# Patient Record
Sex: MALE | Race: WHITE
[De-identification: names, ages, dates, MRNs, and addresses within clinical notes are randomized per-mention and may not be internally consistent; named-entity substitution may affect disease eponyms.]

---

## 2021-07-03 ENCOUNTER — HOSPITAL ENCOUNTER (EMERGENCY)
Dept: HOSPITAL 11 - JP.ED | Age: 56
Discharge: HOME | End: 2021-07-03
Payer: COMMERCIAL
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
## 2021-07-03 DIAGNOSIS — I10: ICD-10-CM

## 2021-07-03 DIAGNOSIS — Z23: ICD-10-CM

## 2021-07-03 DIAGNOSIS — Z79.899: ICD-10-CM

## 2021-07-03 DIAGNOSIS — S60.552A: Primary | ICD-10-CM

## 2021-07-03 NOTE — EDM.PDOC
ED HPI GENERAL MEDICAL PROBLEM





- General


Chief Complaint: Skin Complaint


Stated Complaint: FISHHOOK IN HAND


Time Seen by Provider: 07/03/21 17:00


Source of Information: Reports: Patient, RN.  Denies: Old Records


History Limitations: Reports: No Limitations





- History of Present Illness


INITIAL COMMENTS - FREE TEXT/NARRATIVE: 





54 yo male here with a fish hook in his L hand. Tetanus not UTD. 


Onset: Today, Sudden


Onset Date: 07/03/21


Duration: Minutes:, Constant


Location: Reports: Upper Extremity, Left


Quality: Reports: Sharp


Severity: Mild


Improves with: Reports: Rest


Worsens with: Reports: Movement (of hook)


Context: Reports: Trauma


Associated Symptoms: Reports: No Other Symptoms


Treatments PTA: Reports: Other (see below) (none)


  ** Left Hand


Pain Score (Numeric/FACES): 3





- Related Data


                                    Allergies











Allergy/AdvReac Type Severity Reaction Status Date / Time


 


No Known Allergies Allergy   Verified 07/03/21 16:39











Home Meds: 


                                    Home Meds





Pravastatin [Pravachol] 20 mg PO DAILY 07/03/21 [History]


lisinopriL [Lisinopril] 20 mg PO DAILY 07/03/21 [History]











Past Medical History


Cardiovascular History: Reports: Hypertension





- Infectious Disease History


Infectious Disease History: Reports: Chicken Pox





Social & Family History





- Tobacco Use


Tobacco Use Status *Q: Never Tobacco User





- Caffeine Use


Caffeine Use: Reports: Coffee, Soda, Tea





- Recreational Drug Use


Recreational Drug Use: No





ED ROS GENERAL





- Review of Systems


Review Of Systems: See Below


Constitutional: Reports: No Symptoms


Skin: Reports: Wound (puncture L hand from a fish hook)


Neurological: Reports: No Symptoms





ED EXAM, SKIN/RASH


Exam: See Below


Exam Limited By: No Limitations


General Appearance: Alert, WD/WN, No Apparent Distress


Extremities: Normal Inspection


Neurological: Alert, Oriented, CN II-XII Intact, Normal Cognition, No 

Motor/Sensory Deficits


Psychiatric: Normal Affect, Normal Mood


Skin: Warm, Dry, Normal Color, No Rash, Wound/Incision (fish hook in dorsum of L

  hand).  No: Intact


Location, Skin: Upper Extremity, Left


Characteristics: Other (puncture)


Associated features: Tenderness.  No: Warmth, Induration, Lymphangitis





ED SKIN PROCEDURES





- Foreign Body Removal


Indication:: 





fish hook


Performing Doctor:: Justin Nieves


Anesthesia Type: Local (1% lido x 3.5 ml)


Complications:: No


Comments:: 





fish hook removed by pushing hook through and removing the jf. 





Course





- Vital Signs


Last Recorded V/S: 





                                Last Vital Signs











Temp  36.4 C   07/03/21 16:43


 


Pulse  70   07/03/21 16:43


 


Resp  16   07/03/21 16:43


 


BP  140/88   07/03/21 16:43


 


Pulse Ox  95   07/03/21 16:43














- Orders/Labs/Meds


Orders: 





                               Active Orders 24 hr











 Category Date Time Status


 


 Vaccines to be Administered [RC] PER UNIT ROUTINE Care  07/03/21 17:02 Ordered











Meds: 





Medications














Discontinued Medications














Generic Name Dose Route Start Last Admin





  Trade Name Freq  PRN Reason Stop Dose Admin


 


Bacitracin  1 dose  07/03/21 17:00 





  Bacitracin Oint 1 Gm U/D Packet  TOP  07/03/21 17:01 





  ONETIME ONE  


 


Diphtheria/Tetanus/Acell Pertussis  0.5 ml  07/03/21 17:02 





  Diphtheria,Pertussis(Acell),Tetanus Vaccine 0.5 Ml Syringe  IM  07/03/21 17:03

 





  .ONCE ONE  


 


Lidocaine HCl  5 ml  07/03/21 17:00 





  Lidocaine 1% 5 Ml Sdv  INJECT  07/03/21 17:01 





  ONETIME ONE  














Departure





- Departure


Time of Disposition: 17:25


Disposition: Home, Self-Care 01


Condition: Good


Clinical Impression: 


Fish hook injury of left hand


Qualifiers:


 Encounter type: initial encounter Qualified Code(s): S69.92XA - Unspecified 

injury of left wrist, hand and finger(s), initial encounter








- Discharge Information


*PRESCRIPTION DRUG MONITORING PROGRAM REVIEWED*: Not Applicable


*COPY OF PRESCRIPTION DRUG MONITORING REPORT IN PATIENT REBECCA: Not Applicable


Instructions:  VIS, DTaP (Diphtheria, Tetanus, Pertussis) Vaccine - CDC 

(04/01/2020)


Referrals: 


PCP,None [Primary Care Provider] - 


Forms:  ED Department Discharge


Additional Instructions: 


Clean wound twice daily with soap and water. Dry. Apply Bacitracin and a new 

dressing. Keep wound clean x 3 days. Recheck for signs of infection. 





Sepsis Event Note (ED)





- Evaluation


Sepsis Screening Result: No Definite Risk





- Focused Exam


Vital Signs: 





                                   Vital Signs











  Temp Pulse Resp BP Pulse Ox


 


 07/03/21 16:43  36.4 C  70  16  140/88  95


 


 07/03/21 16:36  36.4 C  70  16  140/88  95














- My Orders


Last 24 Hours: 





My Active Orders





07/03/21 17:02


Vaccines to be Administered [RC] PER UNIT ROUTINE 














- Assessment/Plan


Last 24 Hours: 





My Active Orders





07/03/21 17:02


Vaccines to be Administered [RC] PER UNIT ROUTINE